# Patient Record
Sex: FEMALE | Race: WHITE | Employment: OTHER | URBAN - METROPOLITAN AREA
[De-identification: names, ages, dates, MRNs, and addresses within clinical notes are randomized per-mention and may not be internally consistent; named-entity substitution may affect disease eponyms.]

---

## 2023-11-04 ENCOUNTER — HOSPITAL ENCOUNTER (EMERGENCY)
Age: 83
Discharge: NURSING, REHAB OR LONG-TERM CARE | End: 2023-11-04
Payer: MEDICARE

## 2023-11-04 VITALS
SYSTOLIC BLOOD PRESSURE: 182 MMHG | OXYGEN SATURATION: 100 % | TEMPERATURE: 98.1 F | HEART RATE: 63 BPM | WEIGHT: 135 LBS | DIASTOLIC BLOOD PRESSURE: 89 MMHG | RESPIRATION RATE: 18 BRPM

## 2023-11-04 DIAGNOSIS — L29.9 PRURITIC DISORDER: Primary | ICD-10-CM

## 2023-11-04 PROBLEM — F02.B3 MODERATE LATE ONSET ALZHEIMER'S DEMENTIA WITH MOOD DISTURBANCE (HCC): Status: ACTIVE | Noted: 2023-02-08

## 2023-11-04 PROBLEM — R29.6 UNWITNESSED FALL: Status: ACTIVE | Noted: 2023-03-13

## 2023-11-04 PROBLEM — F33.0 MILD EPISODE OF RECURRENT MAJOR DEPRESSIVE DISORDER (HCC): Status: ACTIVE | Noted: 2023-02-08

## 2023-11-04 PROBLEM — E78.00 PURE HYPERCHOLESTEROLEMIA, UNSPECIFIED: Status: ACTIVE | Noted: 2023-02-08

## 2023-11-04 PROBLEM — T14.8XXA HEMATOMA: Status: ACTIVE | Noted: 2023-03-13

## 2023-11-04 PROBLEM — G30.1 MODERATE LATE ONSET ALZHEIMER'S DEMENTIA WITH MOOD DISTURBANCE (HCC): Status: ACTIVE | Noted: 2023-02-08

## 2023-11-04 PROBLEM — S32.82XA MULTIPLE PELVIC FRACTURES (HCC): Status: ACTIVE | Noted: 2023-03-13

## 2023-11-04 PROCEDURE — 6370000000 HC RX 637 (ALT 250 FOR IP)

## 2023-11-04 PROCEDURE — 99283 EMERGENCY DEPT VISIT LOW MDM: CPT

## 2023-11-04 RX ORDER — MEMANTINE HYDROCHLORIDE 5 MG/1
5 TABLET ORAL NIGHTLY
COMMUNITY

## 2023-11-04 RX ORDER — PREDNISONE 10 MG/1
5 TABLET ORAL ONCE
Status: COMPLETED | OUTPATIENT
Start: 2023-11-04 | End: 2023-11-04

## 2023-11-04 RX ORDER — DIAPER,BRIEF,INFANT-TODD,DISP
EACH MISCELLANEOUS
COMMUNITY
Start: 2023-09-28

## 2023-11-04 RX ORDER — OXYCODONE HYDROCHLORIDE 5 MG/1
TABLET ORAL
COMMUNITY
Start: 2023-10-23

## 2023-11-04 RX ORDER — METHOCARBAMOL 500 MG/1
TABLET, FILM COATED ORAL
COMMUNITY
Start: 2023-09-21

## 2023-11-04 RX ORDER — PSEUDOEPHEDRINE HCL 30 MG
100 TABLET ORAL 2 TIMES DAILY
COMMUNITY
Start: 2023-03-16

## 2023-11-04 RX ORDER — HYDROXYZINE HYDROCHLORIDE 10 MG/1
10 TABLET, FILM COATED ORAL
Status: COMPLETED | OUTPATIENT
Start: 2023-11-04 | End: 2023-11-04

## 2023-11-04 RX ORDER — SERTRALINE HYDROCHLORIDE 100 MG/1
TABLET, FILM COATED ORAL
COMMUNITY
Start: 2023-09-21

## 2023-11-04 RX ORDER — ASPIRIN 325 MG
TABLET, DELAYED RELEASE (ENTERIC COATED) ORAL EVERY 6 HOURS PRN
COMMUNITY

## 2023-11-04 RX ORDER — ATORVASTATIN CALCIUM 10 MG/1
TABLET, FILM COATED ORAL
COMMUNITY
Start: 2023-09-21

## 2023-11-04 RX ORDER — ACETAMINOPHEN 500 MG
1000 TABLET ORAL 3 TIMES DAILY
COMMUNITY
Start: 2023-03-16

## 2023-11-04 RX ORDER — METHYLPREDNISOLONE 4 MG/1
TABLET ORAL
COMMUNITY
Start: 2023-10-23

## 2023-11-04 RX ORDER — LORAZEPAM 0.5 MG/1
TABLET ORAL
COMMUNITY
Start: 2023-10-23

## 2023-11-04 RX ORDER — SENNOSIDES A AND B 8.6 MG/1
2 TABLET, FILM COATED ORAL NIGHTLY
COMMUNITY
Start: 2023-03-16

## 2023-11-04 RX ADMIN — PREDNISONE 5 MG: 10 TABLET ORAL at 10:27

## 2023-11-04 RX ADMIN — HYDROXYZINE HYDROCHLORIDE 10 MG: 10 TABLET ORAL at 10:27

## 2023-11-04 ASSESSMENT — PAIN - FUNCTIONAL ASSESSMENT: PAIN_FUNCTIONAL_ASSESSMENT: NONE - DENIES PAIN

## 2023-11-04 ASSESSMENT — PAIN SCALES - GENERAL: PAINLEVEL_OUTOF10: 0

## 2023-11-04 NOTE — ED NOTES
This nurse called [facility]. Spoke with Iram Boggs there and she stated \"I will let them know\" when this nurse called to give report.       Arnel Melara RN  11/04/23 1037

## 2023-11-04 NOTE — ED PROVIDER NOTES
Emergency Department Provider Note       PCP: No, Pcp   Age: 79 y.o. Sex: female     DISPOSITION Decision To Discharge 11/04/2023 10:06:55 AM       ICD-10-CM    1. Pruritic disorder  L29.9           Medical Decision Making     Complexity of Problems Addressed:  Complexity of Problem: 1 chronic illness with exacerbation. Data Reviewed and Analyzed:  I independently ordered and reviewed each unique test.     The patients assessment required an independent historian: Part of the history obtained from EMS. The reason they were needed is dementia. Discussion of management or test interpretation. Vital signs reviewed, patient stable, NAD, afebrile, nontoxic in appearance     Heart rate 63, O2 saturation 100% on room air    In summary this is an 71-year-old female with history of dementia who presents from assisted living facility via EMS. EMS states they were called at the facility as patient was noted to have an O2 saturation of 80% on room air by staff. When EMS evaluated patient, O2 saturation 99 to 100%. No increased work of breathing. No decrease in O2 saturation. Per EMS they stated that they were going to leave as there was no reason to take patient to the emergency department. Staff stated that patient has increased itchiness as she does have a history of chronic itchiness. Staff had no other concerns    Patient is in stretcher. She states she does not have any complaints at this time. Does not know why she is here at this time. Denies any itchiness although she is sometimes itching. Overall physical exam is reassuring. She does have some faint excoriations on abdomen and extremities but no signs of rash at this time. No lower extremity edema, lungs are clear to auscultation bilaterally. No notable tenderness to abdomen. No guarding or wincing or pulling away with palpation of abdomen.   Mucous membranes are moist.    We will give patient a low-dose of hydroxyzine and 5 mg dictation software was used during the making of this note. This software is not perfect and grammatical and other typographical errors may be present. This note has not been completely proofread for errors.       [facility], [address] [place] [address] Road  11/04/23 1808       [facility], [address] [place] [address]  11/04/23 9149

## 2023-11-04 NOTE — DISCHARGE INSTRUCTIONS
O2 saturation has remained at 99% on room air.     No emergent findings on physical exam    Given low-dose hydroxyzine and low-dose prednisone for patient's chronic itching    Follow-up with provider at facility for any med changes
Home

## 2023-11-04 NOTE — ED TRIAGE NOTES
Pt arrives via EMS from [facility]. Pt is baseline mental status. Pt was sent here due to increased itching. No active bleeding from scratching. Pt not currently scratching. Pt is pleasant, breathing even and unlabored, no signs of distress.     Pt is a DNR.    178/99    98.8F  60 HR  100% RA